# Patient Record
Sex: FEMALE | Race: WHITE | NOT HISPANIC OR LATINO
[De-identification: names, ages, dates, MRNs, and addresses within clinical notes are randomized per-mention and may not be internally consistent; named-entity substitution may affect disease eponyms.]

---

## 2017-07-29 ENCOUNTER — HEALTH MAINTENANCE LETTER (OUTPATIENT)
Age: 57
End: 2017-07-29

## 2019-04-23 ENCOUNTER — COMMUNICATION - HEALTHEAST (OUTPATIENT)
Dept: SCHEDULING | Facility: CLINIC | Age: 59
End: 2019-04-23

## 2021-05-28 NOTE — TELEPHONE ENCOUNTER
"Patient calling with questions regarding her discharge instructions from her ER visit on 4/23/19 for abdominal/back/chest pain. Patient stated that \"Doctor was going over everything and honestly I can only remember a little bit here and there and so I am very confused over the instructions that were given.\"    RN educated the patient on the procedures that were performed while she was in the ED to rule out life threatening problems and reviewed the medication that were prescribed for her.    Triage RN was able to answer all questions and advised caller to make a follow-up appointment with her primary care provider. Patient was agreeable with the plan, will make an appointment with her provider and call back with any other questions she may have.    Reason for Disposition    Information only question and nurse able to answer    Protocols used: NO PROTOCOL AVAILABLE - INFORMATION ONLY-A-[place]      "